# Patient Record
Sex: FEMALE
[De-identification: names, ages, dates, MRNs, and addresses within clinical notes are randomized per-mention and may not be internally consistent; named-entity substitution may affect disease eponyms.]

---

## 2018-09-20 ENCOUNTER — HOSPITAL ENCOUNTER (OUTPATIENT)
Dept: HOSPITAL 31 - C.SDS | Age: 53
Discharge: HOSPICE | End: 2018-09-20
Attending: OTOLARYNGOLOGY
Payer: MEDICAID

## 2018-09-20 VITALS
HEART RATE: 59 BPM | OXYGEN SATURATION: 98 % | TEMPERATURE: 97.8 F | DIASTOLIC BLOOD PRESSURE: 66 MMHG | SYSTOLIC BLOOD PRESSURE: 101 MMHG | RESPIRATION RATE: 18 BRPM

## 2018-09-20 DIAGNOSIS — H66.13: Primary | ICD-10-CM

## 2018-09-20 DIAGNOSIS — E11.9: ICD-10-CM

## 2018-09-20 NOTE — OP
PROCEDURE DATE:  09/20/2018



PREOPERATIVE DIAGNOSIS:  Bilateral chronic otitis media.



POSTOPERATIVE DIAGNOSIS:  Bilateral chronic otitis media.



PROCEDURE:  Bilateral myringotomy with tubes.



SURGEON:  Babak Behin, MD



SIGNIFICANT FINDINGS:  Fluid noted behind both TMs.



DESCRIPTION OF PROCEDURE:  The patient was brought into room, placed in

supine position.  Anesthesia was initiated through facemask.  The patient

was draped in usual manner.  The head was turned.  The right ear was

brought in to view using operative microscope and ear speculum.  Radial

incision was made in the anterior-inferior quadrant of the eardrum.  Fluid

was noted behind the TM and suctioned out.  Tube was placed.  Floxin was

placed.  The head was turned.  The other ear was brought into view using

operative microscope and ear speculum.  Radial incision was made in the

anterior-inferior quadrant of the eardrum.  Fluid was noted behind the TM

and suctioned out.  Tube was placed.  Floxin was placed.  The patient was

then taken off anesthesia and taken to recovery room in stable manner.





__________________________________________

Babak Behin, MD



DD:  09/20/2018 8:08:50

DT:  09/20/2018 8:11:41

Job # [id number]